# Patient Record
Sex: MALE | Race: BLACK OR AFRICAN AMERICAN | URBAN - METROPOLITAN AREA
[De-identification: names, ages, dates, MRNs, and addresses within clinical notes are randomized per-mention and may not be internally consistent; named-entity substitution may affect disease eponyms.]

---

## 2020-02-03 ENCOUNTER — TELEPHONE (OUTPATIENT)
Dept: PULMONOLOGY | Age: 30
End: 2020-02-03

## 2020-07-16 ENCOUNTER — TELEPHONE (OUTPATIENT)
Dept: SLEEP CENTER | Age: 30
End: 2020-07-16

## 2020-07-16 ENCOUNTER — VIRTUAL VISIT (OUTPATIENT)
Dept: PULMONOLOGY | Age: 30
End: 2020-07-16

## 2020-07-16 PROBLEM — E66.812 CLASS 2 OBESITY DUE TO EXCESS CALORIES WITHOUT SERIOUS COMORBIDITY WITH BODY MASS INDEX (BMI) OF 38.0 TO 38.9 IN ADULT: Chronic | Status: ACTIVE | Noted: 2020-06-19

## 2020-07-16 PROBLEM — E66.09 CLASS 2 OBESITY DUE TO EXCESS CALORIES WITHOUT SERIOUS COMORBIDITY WITH BODY MASS INDEX (BMI) OF 38.0 TO 38.9 IN ADULT: Status: ACTIVE | Noted: 2020-06-19

## 2020-07-16 PROBLEM — E66.812 CLASS 2 OBESITY DUE TO EXCESS CALORIES WITHOUT SERIOUS COMORBIDITY WITH BODY MASS INDEX (BMI) OF 38.0 TO 38.9 IN ADULT: Status: ACTIVE | Noted: 2020-06-19

## 2020-07-16 PROBLEM — I10 ESSENTIAL HYPERTENSION: Chronic | Status: ACTIVE | Noted: 2020-06-19

## 2020-07-16 PROBLEM — E66.09 CLASS 2 OBESITY DUE TO EXCESS CALORIES WITHOUT SERIOUS COMORBIDITY WITH BODY MASS INDEX (BMI) OF 38.0 TO 38.9 IN ADULT: Chronic | Status: ACTIVE | Noted: 2020-06-19

## 2020-07-16 PROBLEM — I10 ESSENTIAL HYPERTENSION: Status: ACTIVE | Noted: 2020-06-19

## 2020-07-16 PROCEDURE — 99244 OFF/OP CNSLTJ NEW/EST MOD 40: CPT | Performed by: INTERNAL MEDICINE

## 2020-07-16 ASSESSMENT — SLEEP AND FATIGUE QUESTIONNAIRES
HOW LIKELY ARE YOU TO NOD OFF OR FALL ASLEEP WHILE SITTING INACTIVE IN A PUBLIC PLACE: 1
HOW LIKELY ARE YOU TO NOD OFF OR FALL ASLEEP WHILE SITTING AND READING: 1
HOW LIKELY ARE YOU TO NOD OFF OR FALL ASLEEP WHILE LYING DOWN TO REST IN THE AFTERNOON WHEN CIRCUMSTANCES PERMIT: 2
HOW LIKELY ARE YOU TO NOD OFF OR FALL ASLEEP WHILE SITTING QUIETLY AFTER LUNCH WITHOUT ALCOHOL: 0
ESS TOTAL SCORE: 9
HOW LIKELY ARE YOU TO NOD OFF OR FALL ASLEEP WHILE SITTING AND TALKING TO SOMEONE: 1
HOW LIKELY ARE YOU TO NOD OFF OR FALL ASLEEP WHILE WATCHING TV: 2
HOW LIKELY ARE YOU TO NOD OFF OR FALL ASLEEP IN A CAR, WHILE STOPPED FOR A FEW MINUTES IN TRAFFIC: 0
HOW LIKELY ARE YOU TO NOD OFF OR FALL ASLEEP WHEN YOU ARE A PASSENGER IN A CAR FOR AN HOUR WITHOUT A BREAK: 2

## 2020-07-16 NOTE — PROGRESS NOTES
Mariela Paniagua MD, [facility], [facility]  Tiffanie Kehrt [address]  3rd Floor,  [address] [facility], [address] [phone]   [facility] [address] Carla Castellanos. [address] [facility] [address] [phone]     Video Visit- Consult    Pursuant to the emergency declaration under the [facility], [facility] waiver authority and the [employer] Act, this Virtual  Visit was conducted, with patient's consent, to reduce the patient's risk of exposure to COVID-19. Services were provided through a video synchronous discussion virtually to substitute for in-person clinic visit. Patient was located in their home. Assessment:      Visit Diagnoses and Associated Orders     Hypersomnia   (New Problem)  -  Primary    needs work-up    Home Sleep Study (HST) [58855 Custom]   - Future Order         Snoring   (New Problem)      needs work-up    Home Sleep Study (HST) [31692 Custom]   - Future Order         Essential hypertension   (Stable)           Class 2 obesity due to excess calories without serious comorbidity with body mass index (BMI) of 38.0 to 38.9 in adult   (Stable)                  Plan:      Differential diagnosis includes but not limited to: ORTIZ, PLMD's, narcolepsy, parasomnias. Reviewed with patient ORTIZ (highest likelihood Dx): pathophysiology, diagnosis, complications and treatment. Instructed not to drive when drowsy. Continue medications per his PCP and other physicians. Discussed CDL/DOT rules and regulations. He needs to be off driving till work up is done and, if positive for ORTIZ, has responded to therapy. If positive for ORTIZ will need to be compliant (at least 4 hrs per night of use and using at least 80% of the days in a 30 day period) on his prescribed treatment, needs repeat test(s) to show treatment is effective, and then may need a negative MWT prior to returning to driving.  He also will need 6 month compliance checks if on pressure therapy. This is per federal DOT rules and regulations. Standard of care is to do in-lab PSG but insurance is mandating an inferior HST. 1 wk follow up after study to review his results. The chronic medical conditions listed are directly related to the primary diagnosis listed above. The management of the primary diagnosis affects the secondary diagnosis and vice versa. Continue meds for: HTN. Pt would medically benefit from wt loss for ORTIZ (diet, exercise, surgical). Orders Placed This Encounter   Procedures    Home Sleep Study (HST)          Subjective:     Patient ID: Doris Shafer is a 31 y.o. male. Chief Complaint   Patient presents with    Snoring       HPI:      Doris Shafer is a 31 y.o. male referred by Vijay Castano MD for a sleep evaluation. He complains of: snoring, witnessed apneas, excessive daytime sleepiness , non-restorative sleep, AM headaches and tossing and turning at night. He denies: cataplexy and hypnagogic hallucinations. Hypertension and obesity: stable on meds and followed by pt's PCP and other physicians. Previous evaluation and treatment has included- none    DOT/CDL - No  FAA/'s license -No    Previous Report(s) Reviewed: historical medical records, office notes, andreferral letter(s). Pertinent data has been documented. [place] - Total score: 9    Caffeine Intake - None.     Social History     Socioeconomic History    Marital status: Single     Spouse name: Not on file    Number of children: Not on file    Years of education: Not on file    Highest education level: Not on file   Occupational History    Not on file   Social Needs    Financial resource strain: Not on file    Food insecurity     Worry: Not on file     Inability: Not on file    Transportation needs     Medical: Not on file     Non-medical: Not on file   Tobacco Use    Smoking status: Never Smoker    Smokeless tobacco: Never Used   Substance and Sexual Activity    Alcohol use: Yes     Comment: occas    Drug use: No    Sexual activity: Not on file   Lifestyle    Physical activity     Days per week: Not on file     Minutes per session: Not on file    Stress: Not on file   Relationships    Social connections     Talks on phone: Not on file     Gets together: Not on file     Attends Baptism service: Not on file     Active member of club or organization: Not on file     Attends meetings of clubs or organizations: Not on file     Relationship status: Not on file    Intimate partner violence     Fear of current or ex partner: Not on file     Emotionally abused: Not on file     Physically abused: Not on file     Forced sexual activity: Not on file   Other Topics Concern    Not on file   Social History Narrative    Not on file        No current outpatient medications on file. No current facility-administered medications for this visit. Allergies as of 07/16/2020    (No Known Allergies)       Patient Active Problem List   Diagnosis    Essential hypertension    Class 2 obesity due to excess calories without serious comorbidity with body mass index (BMI) of 38.0 to 38.9 in adult       Past Medical History:   Diagnosis Date    Class 2 obesity due to excess calories without serious comorbidity with body mass index (BMI) of 38.0 to 38.9 in adult 6/19/2020    Last Assessment & Plan:  - Emphasized importance of weight loss, diet, and exercise    Essential hypertension 6/19/2020    Last Assessment & Plan:  - DBP elevated in office today - Will start on low dose of Amlodipine - Extensively counseled on importance of DASH diet, weight loss, exercise - Recommend checking BP at home and keeping a log - Discussed coming off medication if he is able to lose weight and control with diet and exercise - Patient agreeable to plan - Follow up in 1 month for Annual Physical       History reviewed. No pertinent surgical history.     Family History   Problem Relation Age of Onset    Sleep Apnea Father        Objective:     Vitals:  Patient reported Height and Weight Calculated BMI   Patient-Reported Vitals 7/16/2020   Patient-Reported Weight 270 lbs   Patient-Reported Height 6'       36.6     Due to COVID-19 this was a virtual visit and physical exam was deferred.     Electronically signed by Jefry Castillo MD on7/16/2020 at 9:30 AM

## 2020-07-16 NOTE — TELEPHONE ENCOUNTER
Called to schedule hst for [employer]. Pt is starting new job and needs to get a better schedule with that and will call my number back when he's ready to schedule the hst sleep study.

## 2020-07-16 NOTE — LETTER
[facility] Sleep Medicine  [place] [address]  Phone: [phone]  Fax: [phone]      July 16, 2020       Patient: Aracely Cabral   MR Number: [id number]   YOB: 1990   Date of Visit: 7/16/2020     Thank you for allowing me to participate in the care of Aracely Cabral. Here is my assessment and plan. Also attached is a copy of his consult note:    ASSESSMENT:  Visit Diagnoses and Associated Orders     Hypersomnia   (New Problem)  -  Primary    needs work-up    Home Sleep Study (HST) [[id number] Custom]   - Future Order         Snoring   (New Problem)      needs work-up    Home Sleep Study (HST) [28101 Custom]   - Future Order         Essential hypertension   (Stable)           Class 2 obesity due to excess calories without serious comorbidity with body mass index (BMI) of 38.0 to 38.9 in adult   (Stable)                 Plan:  Differential diagnosis includes but not limited to: ORTIZ, PLMD's, narcolepsy, parasomnias. Reviewed with patient ORTIZ (highest likelihood Dx): pathophysiology, diagnosis, complications and treatment. Instructed not to drive when drowsy. Continue medications per his PCP and other physicians. Discussed CDL/DOT rules and regulations. He needs to be off driving till work up is done and, if positive for ORTIZ, has responded to therapy. If positive for ORTIZ will need to be compliant (at least 4 hrs per night of use and using at least 80% of the days in a 30 day period) on his prescribed treatment, needs repeat test(s) to show treatment is effective, and then may need a negative MWT prior to returning to driving. He also will need 6 month compliance checks if on pressure therapy. This is per federal DOT rules and regulations. Standard of care is to do in-lab PSG but insurance is mandating an inferior HST. 1 wk follow up after study to review his results.   The chronic medical conditions listed are directly related to the primary diagnosis listed above. The management of the primary diagnosis affects the secondary diagnosis and vice versa. Continue meds for: HTN. Pt would medically benefit from wt loss for ORTIZ (diet, exercise, surgical). Orders Placed This Encounter   Procedures    Home Sleep Study (HST)         If you have questions or concerns, please do not hesitate to call me. I look forward to following Dontrell Neumann along with you.     Sincerely,      Sue Padgett MD    CC providers:  Jyotsna Telles MD  [address] [place] [address] Avenue: [phone]

## 2020-10-29 ENCOUNTER — TELEPHONE (OUTPATIENT)
Dept: SLEEP CENTER | Age: 30
End: 2020-10-29

## 2020-11-13 ENCOUNTER — HOSPITAL ENCOUNTER (OUTPATIENT)
Dept: SLEEP CENTER | Age: 30
Discharge: HOME | End: 2020-11-13
Payer: COMMERCIAL

## 2020-11-13 PROCEDURE — 95806 SLEEP STUDY UNATT&RESP EFFT: CPT | Performed by: INTERNAL MEDICINE

## 2020-11-13 PROCEDURE — 95806 SLEEP STUDY UNATT&RESP EFFT: CPT

## 2020-11-18 ENCOUNTER — TELEPHONE (OUTPATIENT)
Dept: PULMONOLOGY | Age: 30
End: 2020-11-18

## 2020-11-20 ENCOUNTER — TELEPHONE (OUTPATIENT)
Dept: PULMONOLOGY | Age: 30
End: 2020-11-20

## 2020-11-20 NOTE — PROGRESS NOTES
Favio Alberto         : 1990 A1    Diagnosis: [x] ORTIZ (G47.33) [] CSA (G47.31) [] Apnea (G47.30)   Length of Need: [x] 12 Months [] 99 Months [] Other:    Machine (LAUREN!): [x] Respironics Dream Station      Auto [] ResMed AirSense     Auto [] Other:     [x]  CPAP () [] Bilevel ()   Mode: [x] Auto [] Spontaneous    Mode: [] Auto [] Spontaneous          Pmin 7 cmH2O  Pmax 17 cmH2O                 Comfort Settings:   - Ramp Pressure: 4 cmH2O                                        - Ramp time: 15 min                                     -  Flex/EPR - 3 full time                                    - For ResMed Bilevel (TiMax-4 sec   TiMin- 0.2 sec)     Humidifier: [x] Heated ()        [x] Water chamber replacement ()/ 1 per 6 months        Mask:   [x] Nasal () /1 per 3 months [] Full Face () /1 per 3 months   [x] Patient choice -Size and fit mask [] Patient Choice - Size and fit mask   [] Dispense:  [] Dispense:    [x] Headgear () / 1 per 3 months [] Headgear () / 1 per 3 months   [x] Replacement Nasal Cushion ()/2 per month [] Interface Replacement ()/1 per month   [] Replacement Nasal Pillows ()/2 per month         Tubing: [x] Heated ()/1 per 3 months    [] Standard ()/1 per 3 months [] Other:           Filters: [x] Non-disposable ()/1 per 6 months     [x] Ultra-Fine, Disposable ()/2 per month        Miscellaneous: [] Chin Strap ()/ 1 per 6 months [] O2 bleed-in:       LPM   [] Oximetry on CPAP/Bilevel []  Other:    [x] Modem: ()         Start Order Date: 20    MEDICAL JUSTIFICATION:  I, the undersigned, certify that the above prescribed supplies are medically necessary for this patients wellbeing. In my opinion, the supplies are both reasonable and necessary in reference to accepted standards of medicalpractice in treatment of this patients condition.     Anamika Maradiaga MD      NPI: [id number]       Order Signed Date: 11/20/20    Electronically signed by Rosa Isela Montoya MD on 11/20/2020 at 1:15 PM

## 2020-12-09 ENCOUNTER — TELEPHONE (OUTPATIENT)
Dept: PULMONOLOGY | Age: 30
End: 2020-12-09

## 2021-01-22 ENCOUNTER — TELEPHONE (OUTPATIENT)
Dept: PULMONOLOGY | Age: 31
End: 2021-01-22

## 2021-01-22 NOTE — TELEPHONE ENCOUNTER
Spoke with pt's spouse \"Lumadi\" pt will be changing insurance 02/01/2021 and will wait to get unit.   Per spouse pt also prefers to change DME company stating they were given \"misinformation\" concerning cost.

## 2023-03-16 ENCOUNTER — OFFICE VISIT (OUTPATIENT)
Dept: SLEEP MEDICINE | Age: 33
End: 2023-03-16

## 2023-03-16 VITALS
RESPIRATION RATE: 18 BRPM | OXYGEN SATURATION: 99 % | TEMPERATURE: 96.9 F | WEIGHT: 283 LBS | HEART RATE: 90 BPM | BODY MASS INDEX: 39.62 KG/M2 | HEIGHT: 71 IN | DIASTOLIC BLOOD PRESSURE: 80 MMHG | SYSTOLIC BLOOD PRESSURE: 140 MMHG

## 2023-03-16 DIAGNOSIS — I10 ESSENTIAL HYPERTENSION: Chronic | ICD-10-CM

## 2023-03-16 DIAGNOSIS — G47.33 OSA (OBSTRUCTIVE SLEEP APNEA): Primary | ICD-10-CM

## 2023-03-16 DIAGNOSIS — E66.09 CLASS 2 OBESITY DUE TO EXCESS CALORIES WITHOUT SERIOUS COMORBIDITY WITH BODY MASS INDEX (BMI) OF 39.0 TO 39.9 IN ADULT: ICD-10-CM

## 2023-03-16 DIAGNOSIS — R06.83 SNORING: ICD-10-CM

## 2023-03-16 ASSESSMENT — ENCOUNTER SYMPTOMS
ALLERGIC/IMMUNOLOGIC NEGATIVE: 1
RESPIRATORY NEGATIVE: 1
EYES NEGATIVE: 1
GASTROINTESTINAL NEGATIVE: 1

## 2023-03-16 ASSESSMENT — SLEEP AND FATIGUE QUESTIONNAIRES
HOW LIKELY ARE YOU TO NOD OFF OR FALL ASLEEP WHILE SITTING INACTIVE IN A PUBLIC PLACE: 0
HOW LIKELY ARE YOU TO NOD OFF OR FALL ASLEEP WHILE SITTING AND READING: 0
HOW LIKELY ARE YOU TO NOD OFF OR FALL ASLEEP IN A CAR, WHILE STOPPED FOR A FEW MINUTES IN TRAFFIC: 0
HOW LIKELY ARE YOU TO NOD OFF OR FALL ASLEEP WHILE SITTING QUIETLY AFTER LUNCH WITHOUT ALCOHOL: 0
ESS TOTAL SCORE: 3
NECK CIRCUMFERENCE (INCHES): 19
HOW LIKELY ARE YOU TO NOD OFF OR FALL ASLEEP WHILE WATCHING TV: 0
HOW LIKELY ARE YOU TO NOD OFF OR FALL ASLEEP WHEN YOU ARE A PASSENGER IN A CAR FOR AN HOUR WITHOUT A BREAK: 0
HOW LIKELY ARE YOU TO NOD OFF OR FALL ASLEEP WHILE SITTING AND TALKING TO SOMEONE: 0
HOW LIKELY ARE YOU TO NOD OFF OR FALL ASLEEP WHILE LYING DOWN TO REST IN THE AFTERNOON WHEN CIRCUMSTANCES PERMIT: 3

## 2023-03-16 NOTE — PATIENT INSTRUCTIONS
Orders Placed This Encounter   Procedures    Home Sleep Study     Standing Status:   Future     Standing Expiration Date:   3/16/2024     Order Specific Question:   Location For Sleep Study     Answer:   [place]     Order Specific Question:   Select Sleep Lab Location     Answer:   [facility]

## 2023-03-16 NOTE — PROGRESS NOTES
Evangelist Arenas MD  [facility] Board Certified in Sleep Medicine  Certified [facility] Sleep Medicine  Board Certified in Neurology [address] [facility] [address] [facility], [place] [address] ([address]  [address], [address]           [address] [facility] [address] [address] [phone]    Subjective:     Patient ID: Lord Jones is a 34 y.o. male. Chief Complaint   Patient presents with    [facility] Care    Sleep Apnea       HPI:        Lord Jones is a 34 y.o. male referred by Dr. Gaviota Palomares for a sleep evaluation. He complains of snoring, take naps during the day, DOT but he denies snorting, choking, periods of not breathing, knees buckling with laughing, completely or partially paralyzed while falling asleep or waking up, noisy environment, uncomfortable room temperature, uncomfortable bedding. Symptoms began a few years ago, gradually worsening since that time. The patient's bed-partner confirmed the snoring without the stopped breathing at night. Works 5  night a week from 10:30-11:30 PM- [employer]: Goes to bed around 3-4 PM in the weekdays and 10 PM-1 AM in the weekends. It usually takes the patient 10-15 minutes to fall asleep. The patient gets up 0 per night to go to the bathroom. The Patient finally gets up at 9 PM during the weekdays and 10-22 AM in the weekends. The patient has restless sleep with frequent arousals in addition to the Patient has significant daytime sleepiness. The Patient scored [place] Sleepiness Score: 3 on Rockledge Sleepiness Scale ( more than 10 is indicative of daytime sleepiness) The patient takes daily nap for 120 minutes and usually is not refreshing nap. Previous evaluation and treatment has included-  HST .     Had home study done on 11/15/2020 which showed an AHI - 21.1/hr with Low SaO2 - 77% and time below 90% of 31.4 min. This is consistent with moderate ORTIZ (327.23) weight was 270 pounds. The Patient has been obese for many years and tried, has gained 13 in the last 5 years,  unsuccessfully to lose weight through diet, exercise. DOT/CDL - N/A  FAA/'b[place]one - N/A  The patient HTN is stable.      Previous Report(s) Reviewed: historical medical records       Social History     Socioeconomic History    Marital status: Single     Spouse name: Not on file    Number of children: Not on file    Years of education: Not on file    Highest education level: Not on file   Occupational History    Not on file   Tobacco Use    Smoking status: Never     Passive exposure: Never    Smokeless tobacco: Never   Vaping Use    Vaping Use: Never used   Substance and Sexual Activity    Alcohol use: Yes     Comment: occas    Drug use: No    Sexual activity: Not on file   Other Topics Concern    Not on file   Social History Narrative    Not on file     Social Determinants of Health     Financial Resource Strain: Not on file   Food Insecurity: Not on file   Transportation Needs: Not on file   Physical Activity: Not on file   Stress: Not on file   Social Connections: Not on file   Intimate Partner Violence: Not on file   Housing Stability: Not on file       Prior to Admission medications    Not on File       Allergies as of 03/16/2023    (No Known Allergies)       Patient Active Problem List   Diagnosis    Essential hypertension    Class 2 obesity due to excess calories without serious comorbidity with body mass index (BMI) of 38.0 to 38.9 in adult       Past Medical History:   Diagnosis Date    Class 2 obesity due to excess calories without serious comorbidity with body mass index (BMI) of 38.0 to 38.9 in adult 06/19/2020    Last Assessment & Plan:  - Emphasized importance of weight loss, diet, and exercise    Essential hypertension 06/19/2020    Last Assessment & Plan:  - DBP elevated in office today - Will start on low dose of Amlodipine - Extensively counseled on importance of DASH diet, weight loss, exercise - Recommend checking BP at home and keeping a log - Discussed coming off medication if he is able to lose weight and control with diet and exercise - Patient agreeable to plan - Follow up in 1 month for Annual Physical    Sleep apnea        No past surgical history on file.    Family History   Problem Relation Age of Onset    Sleep Apnea Father        Review of Systems   Constitutional: Negative.    HENT: Negative.     Eyes: Negative.    Respiratory: Negative.     Cardiovascular: Negative.    Gastrointestinal: Negative.    Endocrine: Negative.    Genitourinary: Negative.    Musculoskeletal: Negative.    Allergic/Immunologic: Negative.    Neurological: Negative.    Hematological: Negative.    Psychiatric/Behavioral: Negative.       Objective:     Vitals:  Weight BMI Neck circumference    Wt Readings from Last 3 Encounters:   03/16/23 283 lb (128.4 kg)    Body mass index is 39.47 kg/m². Neck circumference (Inches): 19     BP HR SaO2   BP Readings from Last 3 Encounters:   03/16/23 (!) 140/80    Pulse Readings from Last 3 Encounters:   03/16/23 90    SpO2 Readings from Last 3 Encounters:   03/16/23 99%        The mandibular molar Class :   [x]1 []2 []3      Mallampati I Airway Classification:   []1 []2 []3 [x]4        Physical Exam  Vitals and nursing note reviewed.   Constitutional:       Appearance: Normal appearance.   HENT:      Head: Atraumatic.      Nose: Nose normal.      Mouth/Throat:      Comments: Mallampati class 4, no retrognathia or hypognathia , normal airflow in bilateral nostrils, no septum deviation , crowded oropharynx , no tonsils enlargement.   Eyes:      Extraocular Movements: Extraocular movements intact.   Cardiovascular:      Rate and Rhythm: Normal rate and regular rhythm.   Pulmonary:      Effort: Pulmonary effort is normal.      Breath sounds: Normal breath sounds.  Musculoskeletal:      Right lower leg: No edema. Left lower leg: No edema. Skin:     General: Skin is warm. Neurological:      Mental Status: Mental status is at baseline. Psychiatric:         Mood and Affect: Mood normal.       Assessment:   Obstructive Sleep Apnea/Hypopnea Syndrome     Diagnosis Orders   1. ORTIZ (obstructive sleep apnea)  Home Sleep Study      2. Essential hypertension        3. Class 2 obesity due to excess calories without serious comorbidity with body mass index (BMI) of 39.0 to 39.9 in adult        4. Snoring          Plan:     Patient was counseled about the pathophysiology of obstructive sleep apnea syndrome and the methods for evaluating its presence and severity. Patient was counseled to avoid driving and other potentially hazardous circumstances if the patient is experiencing excessive sleepiness. Treatment considerations include the use of nasal CPAP, In the meantime, the patient should be cautioned to avoid the use of alcohol or other depressant medications because of potential for increasing the duration and severity of apnea and cautioned regarding driving or operating and dangerous equipment if the patient is experiencing daytime sleepiness. .  Most likely treating the ORTIZ will have positive impact on HTN control. The proportionality between weight and AHI. With 10% weight change, the AHI has a 27% proportionate change. With 20% weight change, the AHI has a 45-50% proportionate change. Orders Placed This Encounter   Procedures    Home Sleep Study         Return for F/U between 31 and 90 days from the recieving CPAP.     Ariana Dukes MD  Medical Director - [facility]

## 2025-02-27 ENCOUNTER — TELEPHONE (OUTPATIENT)
Dept: PRIMARY CARE CLINIC | Age: 35
End: 2025-02-27